# Patient Record
Sex: MALE | Race: WHITE
[De-identification: names, ages, dates, MRNs, and addresses within clinical notes are randomized per-mention and may not be internally consistent; named-entity substitution may affect disease eponyms.]

---

## 2020-01-06 ENCOUNTER — HOSPITAL ENCOUNTER (OUTPATIENT)
Dept: HOSPITAL 63 - PMG | Age: 35
Discharge: HOME | End: 2020-01-06
Attending: NURSE PRACTITIONER
Payer: COMMERCIAL

## 2020-01-06 DIAGNOSIS — R60.0: Primary | ICD-10-CM

## 2020-01-06 DIAGNOSIS — M79.89: ICD-10-CM

## 2020-01-06 DIAGNOSIS — R06.09: ICD-10-CM

## 2020-01-06 PROCEDURE — 71046 X-RAY EXAM CHEST 2 VIEWS: CPT

## 2020-01-06 PROCEDURE — 93971 EXTREMITY STUDY: CPT

## 2020-01-06 NOTE — RAD
Examination: VENOUS LOWER EXTREMITY LEFT

 

History: Lower extremity swelling

 

Comparison/Correlation: None

 

FINDINGS: Left lower extremity duplex venous ultrasound exam was 

performed. Grayscale, color Doppler, and spectral Doppler imaging was 

performed. Compression and augmentation was performed. Exam is limited due

to patient body habitus.

 

The left common femoral vein, superficial  femoral vein, popliteal vein, 

and greater saphenous vein are normal with no evidence of deep venous 

thrombus. Normal compressibility and augmentation is evident. Left calf 

veins are not well visualized due to presence of subcutaneous edema 

involving the calf.

 

 

IMPRESSION:

Distal left lower extremity subcutaneous edema.

 

No evidence of deep venous thrombus involving the left lower extremity.

 

Electronically signed by: Kwasi Ruano MD (1/6/2020 3:56 PM) [facility]

## 2020-01-06 NOTE — RAD
CHEST PA   LATERAL

 

History: Dyspnea on exertion 

 

Comparison: None.

 

Findings: 

Frontal and lateral views of the chest were obtained. Posterior 

costophrenic angles are not fully included on the lateral view.

 

The cardiomediastinal silhouette is normal. Pulmonary vasculature is 

normal. The lungs are clear. No significant pleural effusion or 

pneumothorax is seen. There is no acute bone abnormality. 

 

IMPRESSION: 

No acute cardiopulmonary process. 

 

 

Electronically signed by: Kwasi Ruano MD (1/6/2020 4:59 PM) [facility]